# Patient Record
Sex: FEMALE | Race: BLACK OR AFRICAN AMERICAN | Employment: FULL TIME | URBAN - METROPOLITAN AREA
[De-identification: names, ages, dates, MRNs, and addresses within clinical notes are randomized per-mention and may not be internally consistent; named-entity substitution may affect disease eponyms.]

---

## 2018-08-19 ENCOUNTER — APPOINTMENT (OUTPATIENT)
Dept: ULTRASOUND IMAGING | Age: 33
End: 2018-08-19
Attending: EMERGENCY MEDICINE
Payer: COMMERCIAL

## 2018-08-19 ENCOUNTER — APPOINTMENT (OUTPATIENT)
Dept: ULTRASOUND IMAGING | Age: 33
End: 2018-08-19
Attending: PHYSICIAN ASSISTANT
Payer: COMMERCIAL

## 2018-08-19 ENCOUNTER — APPOINTMENT (OUTPATIENT)
Dept: CT IMAGING | Age: 33
End: 2018-08-19
Attending: EMERGENCY MEDICINE
Payer: COMMERCIAL

## 2018-08-19 ENCOUNTER — HOSPITAL ENCOUNTER (EMERGENCY)
Age: 33
Discharge: HOME | End: 2018-08-19
Attending: EMERGENCY MEDICINE
Payer: COMMERCIAL

## 2018-08-19 VITALS
TEMPERATURE: 98.4 F | WEIGHT: 293 LBS | RESPIRATION RATE: 18 BRPM | BODY MASS INDEX: 47.09 KG/M2 | DIASTOLIC BLOOD PRESSURE: 75 MMHG | OXYGEN SATURATION: 100 % | SYSTOLIC BLOOD PRESSURE: 139 MMHG | HEART RATE: 87 BPM | HEIGHT: 66 IN

## 2018-08-19 DIAGNOSIS — R10.32 ABDOMINAL PAIN, LLQ (LEFT LOWER QUADRANT): Primary | ICD-10-CM

## 2018-08-19 LAB
ANION GAP SERPL CALC-SCNC: 10 MMOL/L (ref 5–15)
APPEARANCE UR: ABNORMAL
BACTERIA URNS QL MICRO: NEGATIVE /HPF
BASOPHILS # BLD: 0 K/UL (ref 0–0.1)
BASOPHILS NFR BLD: 0 % (ref 0–1)
BILIRUB UR QL: NEGATIVE
BUN SERPL-MCNC: 11 MG/DL (ref 6–20)
BUN/CREAT SERPL: 12 (ref 12–20)
CALCIUM SERPL-MCNC: 8.5 MG/DL (ref 8.5–10.1)
CHLORIDE SERPL-SCNC: 106 MMOL/L (ref 97–108)
CO2 SERPL-SCNC: 24 MMOL/L (ref 21–32)
COLOR UR: ABNORMAL
COMMENT, HOLDF: NORMAL
CREAT SERPL-MCNC: 0.93 MG/DL (ref 0.55–1.02)
DIFFERENTIAL METHOD BLD: ABNORMAL
EOSINOPHIL # BLD: 0.1 K/UL (ref 0–0.4)
EOSINOPHIL NFR BLD: 1 % (ref 0–7)
EPITH CASTS URNS QL MICRO: ABNORMAL /LPF
ERYTHROCYTE [DISTWIDTH] IN BLOOD BY AUTOMATED COUNT: 17 % (ref 11.5–14.5)
GLUCOSE SERPL-MCNC: 100 MG/DL (ref 65–100)
GLUCOSE UR STRIP.AUTO-MCNC: NEGATIVE MG/DL
HCG UR QL: NEGATIVE
HCT VFR BLD AUTO: 37.1 % (ref 35–47)
HGB BLD-MCNC: 11.8 G/DL (ref 11.5–16)
HGB UR QL STRIP: NEGATIVE
IMM GRANULOCYTES # BLD: 0.1 K/UL (ref 0–0.04)
IMM GRANULOCYTES NFR BLD AUTO: 1 % (ref 0–0.5)
KETONES UR QL STRIP.AUTO: NEGATIVE MG/DL
LEUKOCYTE ESTERASE UR QL STRIP.AUTO: NEGATIVE
LYMPHOCYTES # BLD: 1.8 K/UL (ref 0.8–3.5)
LYMPHOCYTES NFR BLD: 20 % (ref 12–49)
MCH RBC QN AUTO: 25.9 PG (ref 26–34)
MCHC RBC AUTO-ENTMCNC: 31.8 G/DL (ref 30–36.5)
MCV RBC AUTO: 81.5 FL (ref 80–99)
MONOCYTES # BLD: 0.5 K/UL (ref 0–1)
MONOCYTES NFR BLD: 6 % (ref 5–13)
NEUTS SEG # BLD: 6.4 K/UL (ref 1.8–8)
NEUTS SEG NFR BLD: 72 % (ref 32–75)
NITRITE UR QL STRIP.AUTO: NEGATIVE
NRBC # BLD: 0 K/UL (ref 0–0.01)
NRBC BLD-RTO: 0 PER 100 WBC
PH UR STRIP: 5.5 [PH] (ref 5–8)
PLATELET # BLD AUTO: 213 K/UL (ref 150–400)
PMV BLD AUTO: 13 FL (ref 8.9–12.9)
POTASSIUM SERPL-SCNC: 4.3 MMOL/L (ref 3.5–5.1)
PROT UR STRIP-MCNC: NEGATIVE MG/DL
RBC # BLD AUTO: 4.55 M/UL (ref 3.8–5.2)
RBC #/AREA URNS HPF: ABNORMAL /HPF (ref 0–5)
SAMPLES BEING HELD,HOLD: NORMAL
SODIUM SERPL-SCNC: 140 MMOL/L (ref 136–145)
SP GR UR REFRACTOMETRY: 1.02 (ref 1–1.03)
UR CULT HOLD, URHOLD: NORMAL
UROBILINOGEN UR QL STRIP.AUTO: 0.2 EU/DL (ref 0.2–1)
WBC # BLD AUTO: 9 K/UL (ref 3.6–11)
WBC URNS QL MICRO: ABNORMAL /HPF (ref 0–4)

## 2018-08-19 PROCEDURE — 76830 TRANSVAGINAL US NON-OB: CPT

## 2018-08-19 PROCEDURE — 74176 CT ABD & PELVIS W/O CONTRAST: CPT

## 2018-08-19 PROCEDURE — 99284 EMERGENCY DEPT VISIT MOD MDM: CPT

## 2018-08-19 PROCEDURE — 74011250636 HC RX REV CODE- 250/636: Performed by: EMERGENCY MEDICINE

## 2018-08-19 PROCEDURE — 80048 BASIC METABOLIC PNL TOTAL CA: CPT | Performed by: PHYSICIAN ASSISTANT

## 2018-08-19 PROCEDURE — 36415 COLL VENOUS BLD VENIPUNCTURE: CPT | Performed by: PHYSICIAN ASSISTANT

## 2018-08-19 PROCEDURE — 76856 US EXAM PELVIC COMPLETE: CPT

## 2018-08-19 PROCEDURE — 96374 THER/PROPH/DIAG INJ IV PUSH: CPT

## 2018-08-19 PROCEDURE — 85025 COMPLETE CBC W/AUTO DIFF WBC: CPT | Performed by: PHYSICIAN ASSISTANT

## 2018-08-19 PROCEDURE — 81001 URINALYSIS AUTO W/SCOPE: CPT | Performed by: PHYSICIAN ASSISTANT

## 2018-08-19 PROCEDURE — 81025 URINE PREGNANCY TEST: CPT

## 2018-08-19 RX ORDER — KETOROLAC TROMETHAMINE 10 MG/1
10 TABLET, FILM COATED ORAL
Qty: 15 TAB | Refills: 0 | Status: SHIPPED | OUTPATIENT
Start: 2018-08-19 | End: 2018-08-24

## 2018-08-19 RX ORDER — KETOROLAC TROMETHAMINE 30 MG/ML
30 INJECTION, SOLUTION INTRAMUSCULAR; INTRAVENOUS
Status: COMPLETED | OUTPATIENT
Start: 2018-08-19 | End: 2018-08-19

## 2018-08-19 RX ADMIN — KETOROLAC TROMETHAMINE 30 MG: 30 INJECTION, SOLUTION INTRAMUSCULAR at 15:49

## 2018-08-19 NOTE — ED TRIAGE NOTES
Pt reports pelvic and abdominal pain that began this past Thursday. Pt describes the pain as intermittent and aching. Pt denies N/V/D or vaginal discharge. Pt has IUD that was placed 5 months ago.

## 2018-08-19 NOTE — ED PROVIDER NOTES
HPI Comments: 31yo F with pmh sig for gestational htn, obesity who p/w abd pain. Pain located mostly in LLQ with occasional radiation across front of abd. Pain started 4 days ago. Comes and goes with no exacerbating or alleviating factors. No pain medications taken at home.  + dysuria. No vomiting, diarrhea, fevers. Pt has IUD in place and thought it could just be her period but symptoms continued to worsen. No pain medications taken at home. Patient is a 34 y.o. female presenting with abdominal pain. The history is provided by the patient. Abdominal Pain    Pertinent negatives include no fever, no dysuria, no arthralgias and no chest pain. Past Medical History:   Diagnosis Date    Anemia NEC     with pregnancy    Essential hypertension     pregnancy    Genital herpes, unspecified     on valtrex- last outbreak 05    Gestational HTN 2011       Past Surgical History:   Procedure Laterality Date     DELIVERY ONLY          HX GYN  ,     c section         Family History:   Problem Relation Age of Onset    Hypertension Mother     Cancer Father     Cancer Maternal Grandfather        Social History     Social History    Marital status: SINGLE     Spouse name: N/A    Number of children: N/A    Years of education: N/A     Occupational History    Not on file. Social History Main Topics    Smoking status: Never Smoker    Smokeless tobacco: Never Used    Alcohol use No    Drug use: No    Sexual activity: Yes     Partners: Male     Birth control/ protection: Pill     Other Topics Concern    Not on file     Social History Narrative         ALLERGIES: Iodine    Review of Systems   Constitutional: Negative for fever. HENT: Negative for facial swelling. Eyes: Negative for visual disturbance. Respiratory: Negative for chest tightness. Cardiovascular: Negative for chest pain. Gastrointestinal: Positive for abdominal pain.    Genitourinary: Negative for dysuria. Musculoskeletal: Negative for arthralgias. Skin: Negative for rash. Neurological: Negative for dizziness. Hematological: Negative for adenopathy. Psychiatric/Behavioral: Negative for suicidal ideas. Vitals:    08/19/18 1450   BP: (!) 159/91   Pulse: (!) 106   Resp: 18   Temp: 98.4 °F (36.9 °C)   SpO2: 96%   Weight: (!) 248.6 kg (548 lb)   Height: 5' 6\" (1.676 m)            Physical Exam   Constitutional: She is oriented to person, place, and time. She appears well-developed. No distress. Obese   HENT:   Head: Normocephalic and atraumatic. Mouth/Throat: Oropharynx is clear and moist.   Eyes: Pupils are equal, round, and reactive to light. No scleral icterus. Neck: Normal range of motion. Neck supple. No thyromegaly present. Cardiovascular: Normal rate, regular rhythm, normal heart sounds and intact distal pulses. No murmur heard. Pulmonary/Chest: Effort normal and breath sounds normal. No respiratory distress. Abdominal: Soft. Bowel sounds are normal. She exhibits no distension. There is no tenderness. Musculoskeletal: Normal range of motion. She exhibits no edema. Neurological: She is alert and oriented to person, place, and time. Skin: Skin is warm and dry. No rash noted. She is not diaphoretic. Nursing note and vitals reviewed. MDM  Number of Diagnoses or Management Options  Diagnosis management comments: A:  LLQ abd pain for 4 days with dysuria. DDx:  Diverticulitis, UTI, PID/cervicitis, obstruction, msk pain, constipation    P:  Labs  UA  Ct a/p  toradol        ED Course       Procedures    Labs unremarkable. UA neg    CT Abd/Pelvis:  IMPRESSION:    1. Asymmetric enlargement of the left ovary. In the context of left lower  quadrant pain, this may represent ovarian torsion. Clinical correlation is  recommended. Consider further evaluation with pelvic ultrasound.   2. Otherwise, no evidence of acute process in the abdomen or pelvis.       Narrative: Pelvic US: IMPRESSION:   1. Asymmetry in ovarian size, left larger than right, is better demonstrated on  transvaginal imaging. However, both ovaries are normal in appearance with normal  flow. 2. Normal appearance of the uterus. An IUD is present. 8:14 PM  Pelvic US reassuring for no torsion. No clear etiology for pain. Stable for discharge home. F/u with OB/gyn this week.

## 2018-08-19 NOTE — ED NOTES
3:03 PM  I have evaluated the patient as the Provider in Triage. I have reviewed Her vital signs and the triage nurse assessment. I have talked with the patient and any available family and advised that I am the provider in triage and have ordered the appropriate study to initiate their work up based on the clinical presentation during my assessment. I have advised that the patient will be accommodated in the Main ED as soon as possible. I have also requested to contact the triage nurse or myself immediately if the patient experiences any changes in their condition during this brief waiting period. Pt here for a few days of left pelvic pain. No discharge or bleeding.     GREG Kim

## 2018-08-20 NOTE — DISCHARGE INSTRUCTIONS
Abdominal Pain: Care Instructions  Your Care Instructions    Abdominal pain has many possible causes. Some aren't serious and get better on their own in a few days. Others need more testing and treatment. If your pain continues or gets worse, you need to be rechecked and may need more tests to find out what is wrong. You may need surgery to correct the problem. Don't ignore new symptoms, such as fever, nausea and vomiting, urination problems, pain that gets worse, and dizziness. These may be signs of a more serious problem. Your doctor may have recommended a follow-up visit in the next 8 to 12 hours. If you are not getting better, you may need more tests or treatment. The doctor has checked you carefully, but problems can develop later. If you notice any problems or new symptoms, get medical treatment right away. Follow-up care is a key part of your treatment and safety. Be sure to make and go to all appointments, and call your doctor if you are having problems. It's also a good idea to know your test results and keep a list of the medicines you take. How can you care for yourself at home? · Rest until you feel better. · To prevent dehydration, drink plenty of fluids, enough so that your urine is light yellow or clear like water. Choose water and other caffeine-free clear liquids until you feel better. If you have kidney, heart, or liver disease and have to limit fluids, talk with your doctor before you increase the amount of fluids you drink. · If your stomach is upset, eat mild foods, such as rice, dry toast or crackers, bananas, and applesauce. Try eating several small meals instead of two or three large ones. · Wait until 48 hours after all symptoms have gone away before you have spicy foods, alcohol, and drinks that contain caffeine. · Do not eat foods that are high in fat. · Avoid anti-inflammatory medicines such as aspirin, ibuprofen (Advil, Motrin), and naproxen (Aleve).  These can cause stomach upset. Talk to your doctor if you take daily aspirin for another health problem. When should you call for help? Call 911 anytime you think you may need emergency care. For example, call if:    · You passed out (lost consciousness).     · You pass maroon or very bloody stools.     · You vomit blood or what looks like coffee grounds.     · You have new, severe belly pain.    Call your doctor now or seek immediate medical care if:    · Your pain gets worse, especially if it becomes focused in one area of your belly.     · You have a new or higher fever.     · Your stools are black and look like tar, or they have streaks of blood.     · You have unexpected vaginal bleeding.     · You have symptoms of a urinary tract infection. These may include:  ¨ Pain when you urinate. ¨ Urinating more often than usual.  ¨ Blood in your urine.     · You are dizzy or lightheaded, or you feel like you may faint.    Watch closely for changes in your health, and be sure to contact your doctor if:    · You are not getting better after 1 day (24 hours). Where can you learn more? Go to http://ivanna-tj.info/. Enter C793 in the search box to learn more about \"Abdominal Pain: Care Instructions. \"  Current as of: November 20, 2017  Content Version: 11.7  © 5204-3508 [employer]. Care instructions adapted under license by [employer] (which disclaims liability or warranty for this information). If you have questions about a medical condition or this instruction, always ask your healthcare professional. [place] [address] any warranty or liability for your use of this information. We hope that we have addressed all of your medical concerns. The examination and treatment you received in the Emergency Department were for an emergent problem and were not intended as complete care. It is important that you follow up with your healthcare provider(s) for ongoing care. If your symptoms worsen or do not improve as expected, and you are unable to reach your usual health care provider(s), you should return to the Emergency Department. Today's healthcare is undergoing tremendous change, and patient satisfaction surveys are one of the many tools to assess the quality of medical care. You may receive a survey from the [employer] regarding your experience in the Emergency Department. I hope that your experience has been completely positive, particularly the medical care that I provided. As such, please participate in the survey; anything less than excellent does not meet my expectations or intentions. [address] [place] [address] participate in nationally recognized quality of care measures. If your blood pressure is greater than 120/80, as reported below, we urge that you seek medical care to address the potential of high blood pressure, commonly known as hypertension. Hypertension can be hereditary or can be caused by certain medical conditions, pain, stress, or \"white coat syndrome. \"       Please make an appointment with your health care provider(s) for follow up of your Emergency Department visit. VITALS:   Patient Vitals for the past 8 hrs:   Temp Pulse Resp BP SpO2   08/19/18 1900 - - - 111/80 100 %   08/19/18 1845 - - - (!) 144/91 99 %   08/19/18 1830 - - - 144/82 99 %   08/19/18 1815 - - - (!) 140/96 99 %   08/19/18 1800 - - - 128/65 -   08/19/18 1700 - - - 157/64 95 %   08/19/18 1645 - - - 152/67 95 %   08/19/18 1630 - - - 169/89 98 %   08/19/18 1450 98.4 °F (36.9 °C) (!) 106 18 (!) 159/91 96 %          Thank you for allowing us to provide you with medical care today. We realize that you have many choices for your emergency care needs. Please choose us in the future for any continued health care needs.       Anthony Hubbard MD    [facility]   Office: [phone]            Recent Results (from the past 24 hour(s))   CBC WITH AUTOMATED DIFF    Collection Time: 08/19/18  3:21 PM   Result Value Ref Range    WBC 9.0 3.6 - 11.0 K/uL    RBC 4.55 3.80 - 5.20 M/uL    HGB 11.8 11.5 - 16.0 g/dL    HCT 37.1 35.0 - 47.0 %    MCV 81.5 80.0 - 99.0 FL    MCH 25.9 (L) 26.0 - 34.0 PG    MCHC 31.8 30.0 - 36.5 g/dL    RDW 17.0 (H) 11.5 - 14.5 %    PLATELET 737 403 - 970 K/uL    MPV 13.0 (H) 8.9 - 12.9 FL    NRBC 0.0 0  WBC    ABSOLUTE NRBC 0.00 0.00 - 0.01 K/uL    NEUTROPHILS 72 32 - 75 %    LYMPHOCYTES 20 12 - 49 %    MONOCYTES 6 5 - 13 %    EOSINOPHILS 1 0 - 7 %    BASOPHILS 0 0 - 1 %    IMMATURE GRANULOCYTES 1 (H) 0.0 - 0.5 %    ABS. NEUTROPHILS 6.4 1.8 - 8.0 K/UL    ABS. LYMPHOCYTES 1.8 0.8 - 3.5 K/UL    ABS. MONOCYTES 0.5 0.0 - 1.0 K/UL    ABS. EOSINOPHILS 0.1 0.0 - 0.4 K/UL    ABS. BASOPHILS 0.0 0.0 - 0.1 K/UL    ABS. IMM. GRANS. 0.1 (H) 0.00 - 0.04 K/UL    DF AUTOMATED     METABOLIC PANEL, BASIC    Collection Time: 08/19/18  3:21 PM   Result Value Ref Range    Sodium 140 136 - 145 mmol/L    Potassium 4.3 3.5 - 5.1 mmol/L    Chloride 106 97 - 108 mmol/L    CO2 24 21 - 32 mmol/L    Anion gap 10 5 - 15 mmol/L    Glucose 100 65 - 100 mg/dL    BUN 11 6 - 20 MG/DL    Creatinine 0.93 0.55 - 1.02 MG/DL    BUN/Creatinine ratio 12 12 - 20      GFR est AA >60 >60 ml/min/1.73m2    GFR est non-AA >60 >60 ml/min/1.73m2    Calcium 8.5 8.5 - 10.1 MG/DL   SAMPLES BEING HELD    Collection Time: 08/19/18  3:21 PM   Result Value Ref Range    SAMPLES BEING HELD 1RED,1BLU     COMMENT        Add-on orders for these samples will be processed based on acceptable specimen integrity and analyte stability, which may vary by analyte.    URINALYSIS W/MICROSCOPIC    Collection Time: 08/19/18  3:33 PM   Result Value Ref Range    Color YELLOW/STRAW      Appearance CLOUDY (A) CLEAR      Specific gravity 1.025 1.003 - 1.030      pH (UA) 5.5 5.0 - 8.0      Protein NEGATIVE  NEG mg/dL    Glucose NEGATIVE  NEG mg/dL    Ketone NEGATIVE  NEG mg/dL    Bilirubin NEGATIVE  NEG      Blood NEGATIVE  NEG      Urobilinogen 0.2 0.2 - 1.0 EU/dL    Nitrites NEGATIVE  NEG      Leukocyte Esterase NEGATIVE  NEG      WBC 0-4 0 - 4 /hpf    RBC 0-5 0 - 5 /hpf    Epithelial cells MANY (A) FEW /lpf    Bacteria NEGATIVE  NEG /hpf   URINE CULTURE HOLD SAMPLE    Collection Time: 08/19/18  3:33 PM   Result Value Ref Range    Urine culture hold        URINE ON HOLD IN MICROBIOLOGY DEPT FOR 3 DAYS. IF UNPRESERVED URINE IS SUBMITTED, IT CANNOT BE USED FOR ADDITIONAL TESTING AFTER 24 HRS, RECOLLECTION WILL BE REQUIRED. HCG URINE, QL. - POC    Collection Time: 08/19/18  3:36 PM   Result Value Ref Range    Pregnancy test,urine (POC) NEGATIVE  NEG         Ct Abd Pelv Wo Cont    Result Date: 8/19/2018  EXAM:  CT ABD PELV WO CONT INDICATION: LLQ pain, suspect diverticulitis COMPARISON: None. CONTRAST:  None. TECHNIQUE: Thin axial images were obtained through the abdomen and pelvis. Coronal and sagittal reconstructions were generated. Oral contrast was not administered. CT dose reduction was achieved through use of a standardized protocol tailored for this examination and automatic exposure control for dose modulation. FINDINGS: Lower Thorax: Lung Bases: Clear. Heart: The heart is normal in size. No pericardial effusion. Abdomen/Pelvis: Evaluation of the solid organs is markedly limited without the use of IV contrast. Liver:  No focal liver lesions. Biliary system: Gallbladder is contracted. Spleen: Normal. Pancreas: Normal. Kidneys/Ureters/Bladder: No renal masses. No renal or ureteral calculi. No hydronephrosis or hydroureter. The bladder is normal. Adrenals: Normal. Stomach/bowel: No dilation or abnormal wall thickening is present. No free intraperitoneal air noted. Reproductive Organs: IUD in the pelvis. There is asymmetric enlargement of the left ovary. Vasculature: Normal caliber arteries.  Nodes: No pathologically enlarged lymph nodes. Fluid: No free fluid. Bones/Soft Tissue: No acute fractures or aggressive osseous lesions are seen. Diastases rectus. IMPRESSION: 1. Asymmetric enlargement of the left ovary. In the context of left lower quadrant pain, this may represent ovarian torsion. Clinical correlation is recommended. Consider further evaluation with pelvic ultrasound. 2. Otherwise, no evidence of acute process in the abdomen or pelvis. Us Transvaginal    Result Date: 8/19/2018  EXAM:  US pelvic non-OB; US transvaginal non-OB INDICATION:  Left pelvic pain. Left ovary enlargement on CT. COMPARISON:  CT 8/19/2018 TECHNIQUE: Transabdominal and transvaginal ultrasound of the female pelvis. FINDINGS: Transabdominal ultrasound: Urinary bladder: within normal limits. Uterus: measures 9.0 x 4.4 x 5.3 cm, which is within normal limits. There is no sonographic myometrial abnormality. An IUD is present. Endometrium: 2 mm. Right ovary: 2.9 x 1.6 x 2.0 cm (volume 4.9 mL). Blood flow is present in the right ovary. No adnexal mass or cyst. Left ovary: 2.5 x 1.9 x 2.5 cm (volume 6.3 mL). Blood flow is present in the left ovary. No adnexal mass or cyst. Free fluid: None. Endovaginal ultrasound: Uterus: measures 8.8 x 4.6 x 5.5 cm, which is within normal limits. There is no sonographic myometrial abnormality. An IUD is present. Endometrium: 5 mm in thickness. Homogeneous. Right ovary: 3.5 x 1.7 x 1.8 cm (volume 5.5 mL). Blood flow is present in the right ovary. No adnexal mass or cyst. Left ovary: 3.9 x 2.9 x 3.0 cm (volume 18.0 mL). Blood flow is present in the left ovary. No adnexal cyst or mass. Free fluid: None. IMPRESSION: 1. Asymmetry in ovarian size, left larger than right, is better demonstrated on transvaginal imaging. However, both ovaries are normal in appearance with normal flow. 2. Normal appearance of the uterus. An IUD is present.       Us Pelv Non Obs    Result Date: 8/19/2018  EXAM:  US pelvic non-OB; US transvaginal non-OB INDICATION:  Left pelvic pain. Left ovary enlargement on CT. COMPARISON:  CT 8/19/2018 TECHNIQUE: Transabdominal and transvaginal ultrasound of the female pelvis. FINDINGS: Transabdominal ultrasound: Urinary bladder: within normal limits. Uterus: measures 9.0 x 4.4 x 5.3 cm, which is within normal limits. There is no sonographic myometrial abnormality. An IUD is present. Endometrium: 2 mm. Right ovary: 2.9 x 1.6 x 2.0 cm (volume 4.9 mL). Blood flow is present in the right ovary. No adnexal mass or cyst. Left ovary: 2.5 x 1.9 x 2.5 cm (volume 6.3 mL). Blood flow is present in the left ovary. No adnexal mass or cyst. Free fluid: None. Endovaginal ultrasound: Uterus: measures 8.8 x 4.6 x 5.5 cm, which is within normal limits. There is no sonographic myometrial abnormality. An IUD is present. Endometrium: 5 mm in thickness. Homogeneous. Right ovary: 3.5 x 1.7 x 1.8 cm (volume 5.5 mL). Blood flow is present in the right ovary. No adnexal mass or cyst. Left ovary: 3.9 x 2.9 x 3.0 cm (volume 18.0 mL). Blood flow is present in the left ovary. No adnexal cyst or mass. Free fluid: None. IMPRESSION: 1. Asymmetry in ovarian size, left larger than right, is better demonstrated on transvaginal imaging. However, both ovaries are normal in appearance with normal flow. 2. Normal appearance of the uterus. An IUD is present.

## 2018-08-20 NOTE — ED NOTES
Gave pt discharge instructions. Pt verbalized understanding. Pt not having any abdominal pain at this time. Pt ambulated out of department with steady gait.